# Patient Record
Sex: MALE | Race: BLACK OR AFRICAN AMERICAN | NOT HISPANIC OR LATINO | Employment: UNEMPLOYED | URBAN - METROPOLITAN AREA
[De-identification: names, ages, dates, MRNs, and addresses within clinical notes are randomized per-mention and may not be internally consistent; named-entity substitution may affect disease eponyms.]

---

## 2024-08-08 ENCOUNTER — OFFICE VISIT (OUTPATIENT)
Dept: FAMILY MEDICINE CLINIC | Facility: CLINIC | Age: 44
End: 2024-08-08

## 2024-08-08 VITALS
SYSTOLIC BLOOD PRESSURE: 147 MMHG | HEIGHT: 72 IN | HEART RATE: 86 BPM | WEIGHT: 190.2 LBS | TEMPERATURE: 97.1 F | BODY MASS INDEX: 25.76 KG/M2 | DIASTOLIC BLOOD PRESSURE: 89 MMHG | OXYGEN SATURATION: 97 % | RESPIRATION RATE: 16 BRPM

## 2024-08-08 DIAGNOSIS — Z23 NEED FOR COVID-19 VACCINE: ICD-10-CM

## 2024-08-08 DIAGNOSIS — Z23 ENCOUNTER FOR IMMUNIZATION: ICD-10-CM

## 2024-08-08 DIAGNOSIS — K21.9 GASTROESOPHAGEAL REFLUX DISEASE, UNSPECIFIED WHETHER ESOPHAGITIS PRESENT: ICD-10-CM

## 2024-08-08 DIAGNOSIS — Z76.89 ENCOUNTER TO ESTABLISH CARE WITH NEW DOCTOR: Primary | ICD-10-CM

## 2024-08-08 DIAGNOSIS — Z00.00 ANNUAL PHYSICAL EXAM: ICD-10-CM

## 2024-08-08 DIAGNOSIS — Z60.3 IMMIGRANT WITH LANGUAGE DIFFICULTY: ICD-10-CM

## 2024-08-08 RX ORDER — PANTOPRAZOLE SODIUM 40 MG/1
40 TABLET, DELAYED RELEASE ORAL DAILY
Qty: 30 TABLET | Refills: 1 | Status: SHIPPED | OUTPATIENT
Start: 2024-08-08

## 2024-08-08 SDOH — SOCIAL STABILITY - SOCIAL INSECURITY: ACCULTURATION DIFFICULTY: Z60.3

## 2024-08-08 NOTE — PROGRESS NOTES
Ambulatory Visit  Name: Niles Meyer      : 1980      MRN: [id number]  Encounter Provider: Rebecca Fay Kab-Perlman, MD  Encounter Date: 2024   Encounter department: [facility]    Assessment & Plan   1. Encounter to establish care with new doctor  2. Gastroesophageal reflux disease, unspecified whether esophagitis present  Assessment & Plan:  -worse with typical foods    PLAN  -trail PPI   -f/u in about 3 weeks  Orders:  -     pantoprazole (PROTONIX) 40 mg tablet; Take 1 tablet (40 mg total) by mouth daily  3. Annual physical exam  -     Quantiferon TB Gold Plus Assay; Future  4. Immigrant with language difficulty  Comments:  -had to use friend on phone to interpret form brought in and clarify what tests are required  5. 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 Need for COVID-19 vaccine  Comments:  -f/u 3 weeks for second covid vaccine  -cousneling/education  Orders:  -     COVID-19 Pfizer mRNA vaccine 12 yr and older (GRAY cap vial or pre-filled syringe)       History of Present Illness       Rustam ID# [id number], Roddy ID # [id number], Rachel ID #[id number]     Niles Meyer is a 42 yo male patient presenting today to establish care with a new doctor. Additional concerns today include GERD, and the need to complete a form form the [facility] [employer] [place] [employer] that he brought in and is written in Creole. He states he needs some tests but is unable to communicate which ones.     Regarding the GERD, after eating he gets chest pain. Worse with coffee, spicy foods, and acidic foods. He mentions H. Pylori today stating he thinks that's the cause. He is also concerned regarding bleeding gums however we did not have enough time today to discuss that and prioritized figuring out what tests he  "needs per his request of what his priorities are.     Social: He is from [facility] and speaks Creole.     The following portions of the patient's history were reviewed and updated as appropriate: allergies, current medications, past family history, past medical history, past social history, past surgical history, and problem list.    Review of Systems   Constitutional:  Negative for fever.   HENT:          Bleeding gums   Cardiovascular:  Negative for chest pain and palpitations.   Skin:  Negative for rash.   Neurological:  Negative for weakness and headaches.   Hematological:  Negative for adenopathy. Does not bruise/bleed easily.   Psychiatric/Behavioral:  Negative for behavioral problems.      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 No pertinent past medical history.  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 No pertinent family history.  No current outpatient medications on file prior to visit.     No current facility-administered medications on file prior to visit.   No Known Allergies   No current outpatient medications on file prior to visit.     No current facility-administered medications on file prior to visit.      Social History     Tobacco Use    Smoking status: Never    Smokeless tobacco: Never   Vaping Use    Vaping status: Never Used   Substance and Sexual Activity    Alcohol use: Never    Drug use: Never    Sexual activity: Not Currently     Objective     /89 (BP Location: Left arm, Patient Position: Sitting, Cuff Size: Standard)   Pulse 86   Temp (!) 97.1 °F (36.2 °C) (Temporal)   Resp 16   Ht 6' 0.05\" (1.83 m)   Wt 86.3 kg (190 lb 3.2 oz)   SpO2 97%   BMI 25.76 kg/m² "     Physical Exam  Constitutional:       Appearance: Normal appearance. He is normal weight.   HENT:      Head: Normocephalic and atraumatic.      Right Ear: External ear normal.      Left Ear: External ear normal.      Nose: Nose normal.      Mouth/Throat:      Mouth: Mucous membranes are moist.      Pharynx: Oropharynx is clear.   Eyes:      Extraocular Movements: Extraocular movements intact.      Conjunctiva/sclera: Conjunctivae normal.      Pupils: Pupils are equal, round, and reactive to light.   Cardiovascular:      Rate and Rhythm: Normal rate and regular rhythm.      Pulses: Normal pulses.      Heart sounds: Normal heart sounds. No murmur heard.     No friction rub. No gallop.   Pulmonary:      Effort: Pulmonary effort is normal.      Breath sounds: Normal breath sounds. No wheezing, rhonchi or rales.   Abdominal:      General: Abdomen is flat. Bowel sounds are normal. There is no distension.      Palpations: Abdomen is soft. There is no mass.      Tenderness: There is no abdominal tenderness.      Hernia: No hernia is present.   Genitourinary:     Comments: Deferred at this time  Musculoskeletal:         General: Normal range of motion.      Cervical back: Normal range of motion and neck supple. No rigidity or tenderness.      Right lower leg: No edema.      Left lower leg: No edema.      Comments: Muscle strength 5+ upper and lower extremities   Lymphadenopathy:      Cervical: No cervical adenopathy.   Skin:     General: Skin is warm.      Capillary Refill: Capillary refill takes less than 2 seconds.      Findings: No rash.   Neurological:      General: No focal deficit present.      Mental Status: He is alert and oriented to person, place, and time.   Psychiatric:         Mood and Affect: Mood normal.         Behavior: Behavior normal.       Administrative Statements

## 2024-08-09 ENCOUNTER — APPOINTMENT (OUTPATIENT)
Dept: LAB | Facility: HOSPITAL | Age: 44
End: 2024-08-09
Payer: COMMERCIAL

## 2024-08-09 ENCOUNTER — APPOINTMENT (OUTPATIENT)
Dept: LAB | Facility: HOSPITAL | Age: 44
End: 2024-08-09

## 2024-08-09 DIAGNOSIS — Z00.00 ANNUAL PHYSICAL EXAM: ICD-10-CM

## 2024-08-09 PROCEDURE — 36415 COLL VENOUS BLD VENIPUNCTURE: CPT

## 2024-08-09 PROCEDURE — 86480 TB TEST CELL IMMUN MEASURE: CPT

## 2024-08-10 LAB
GAMMA INTERFERON BACKGROUND BLD IA-ACNC: 0.72 IU/ML
M TB IFN-G BLD-IMP: POSITIVE
M TB IFN-G CD4+ BCKGRND COR BLD-ACNC: 2.95 IU/ML
M TB IFN-G CD4+ BCKGRND COR BLD-ACNC: 3.68 IU/ML
MITOGEN IGNF BCKGRD COR BLD-ACNC: 9.28 IU/ML

## 2024-08-14 ENCOUNTER — TELEPHONE (OUTPATIENT)
Dept: FAMILY MEDICINE CLINIC | Facility: CLINIC | Age: 44
End: 2024-08-14

## 2024-08-14 PROBLEM — K21.9 GASTROESOPHAGEAL REFLUX DISEASE: Status: ACTIVE | Noted: 2024-08-14

## 2024-08-15 ENCOUNTER — TELEPHONE (OUTPATIENT)
Dept: FAMILY MEDICINE CLINIC | Facility: CLINIC | Age: 44
End: 2024-08-15

## 2024-08-15 NOTE — TELEPHONE ENCOUNTER
Interpretor used: Yaz ID # [id number]  Patient tested positive for TB on IGRA and informed. Mentioned xray and stated doesn't know if he has insurance yet. Plan is tomorrow I will check with financial counselors confirm he is covered and if so he will have an CxR performed. If not we will send him to the [facility]. Meanwhile advised he wear an N95 mask and let him know I will be in tomorrow and if he comes by I can provide him with one he just needs to let the front staff know that is what he is here for.

## 2024-08-16 NOTE — TELEPHONE ENCOUNTER
Interpretor used Yaz ID# [id number]  Informed patient that didn't have time today to speak with financial counselors before they left for the day. I sent a high priority message to all of our financial counselors regarding his case and to confirm regarding his insurance. He has been informed. Will reach out again tomorrow. Emphasized he get an N95 right away.

## 2024-08-17 ENCOUNTER — HOSPITAL ENCOUNTER (OUTPATIENT)
Dept: RADIOLOGY | Facility: HOSPITAL | Age: 44
Discharge: HOME | End: 2024-08-17
Payer: MEDICARE

## 2024-08-17 DIAGNOSIS — R76.12 POSITIVE QUANTIFERON-TB GOLD TEST: ICD-10-CM

## 2024-08-17 DIAGNOSIS — R76.12 POSITIVE QUANTIFERON-TB GOLD TEST: Primary | ICD-10-CM

## 2024-08-17 PROCEDURE — 71046 X-RAY EXAM CHEST 2 VIEWS: CPT

## 2024-08-17 NOTE — PROGRESS NOTES
Interpretor used Efrain ID# [id number]    Spoke with patient call complete.   Yesterday spoke with financial counselors: patient is in fact insured.   Confirmed with him  that he does in fact have insurance. Agreed that will go today to complete chest xray which I ordered today. Has upcoming appointment with co-resident Dr. Gtz, will speak with her about the case. Then has upcoming appointment with me in September. Once cxr is reviewed will begin appropriate antibiotics.     Also called lab to confirm results with Gold Plus. If nil >0.4 on green and yellow tubes, confirms likely true positive. Grey tube is negative control and purple is positive control. For green and yellow patient nil value is 2.95 and 3.68 which is significant.

## 2024-09-04 ENCOUNTER — OFFICE VISIT (OUTPATIENT)
Dept: FAMILY MEDICINE CLINIC | Facility: CLINIC | Age: 44
End: 2024-09-04

## 2024-09-04 VITALS
DIASTOLIC BLOOD PRESSURE: 82 MMHG | BODY MASS INDEX: 25.73 KG/M2 | SYSTOLIC BLOOD PRESSURE: 130 MMHG | HEART RATE: 63 BPM | OXYGEN SATURATION: 99 % | WEIGHT: 190 LBS | HEIGHT: 72 IN | TEMPERATURE: 98 F | RESPIRATION RATE: 16 BRPM

## 2024-09-04 DIAGNOSIS — Z23 NEED FOR COVID-19 VACCINE: Primary | ICD-10-CM

## 2024-09-04 DIAGNOSIS — Z23 NEED FOR IMMUNIZATION AGAINST POLIOMYELITIS: ICD-10-CM

## 2024-09-04 DIAGNOSIS — Z22.7 TB LUNG, LATENT: ICD-10-CM

## 2024-09-04 PROCEDURE — 90480 ADMN SARSCOV2 VAC 1/ONLY CMP: CPT | Performed by: FAMILY MEDICINE

## 2024-09-04 PROCEDURE — 91320 SARSCV2 VAC 30MCG TRS-SUC IM: CPT | Performed by: FAMILY MEDICINE

## 2024-09-04 PROCEDURE — 99213 OFFICE O/P EST LOW 20 MIN: CPT | Performed by: FAMILY MEDICINE

## 2024-09-04 NOTE — LETTER
September 4, 2024     Patient: Niles Meyer  YOB: 1980  Date of Visit: 9/4/2024      To Whom it May Concern:    Niles Meyer is under my professional care. Niles was seen in my office on 9/4/2024. Niles was also seen in my office on 8/8/2024. He received his first covid vaccine on 8/8/24 along with his first polio vaccine. Today he received his second covid vaccine and he is scheduling a second polio dose for next week. He will have his third and final polio vaccine 6-12 months after his second.     If you have any questions or concerns, please don't hesitate to call.         Sincerely,          Rebecca Fay Kab-Perlman, MD

## 2024-09-04 NOTE — PROGRESS NOTES
Ambulatory Visit  Name: Niles Meyer      : 1980      MRN: [id number]  Encounter Provider: Rebecca Fay Kab-Perlman, MD  Encounter Date: 2024   Encounter department: [facility]    Assessment & Plan   1. Need for COVID-19 vaccine  Comments:  -2nd covid vaccine delivered today  -counseling provided  Orders:  -     COVID-19 Pfizer mRNA vaccine 12 yr and older (Comirnaty pre-filled syringe)  2. Need for immunization against poliomyelitis  Comments:  Completed form for [employer] [place] [employer]; will need nurse visit in 1 week for second polio dose. 3rd visit will be 6-12 months from second dose.  3. TB lung, latent  Assessment & Plan:  -TB gold assay positive   -CXR negative    PLAN  -today referred to [facility] as can receive medications through them and their specialists per attending recommendation       History of Present Illness       Interpretor Used Marin ID# [id number]  Niles Meyer is a 43 yo male patient presenting today to f/u regarding immunizations as required by the department of [place] security.    Review of Systems   Constitutional:  Negative for chills, diaphoresis, fever and unexpected weight change.   Respiratory:  Negative for cough, chest tightness and shortness of breath.    Cardiovascular:  Negative for chest pain, palpitations and leg swelling.     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  Past Surgical History:   Procedure Laterality Date    OTHER SURGICAL HISTORY Left     testicle removed as a teenager because failed descent     No family history on file.  Current Outpatient Medications on File Prior to Visit   Medication Sig  Dispense Refill    pantoprazole (PROTONIX) 40 mg tablet Take 1 tablet (40 mg total) by mouth daily 30 tablet 1     No current facility-administered medications on file prior to visit.   No Known Allergies   Current Outpatient Medications on File Prior to Visit   Medication Sig Dispense Refill    pantoprazole (PROTONIX) 40 mg tablet Take 1 tablet (40 mg total) by mouth daily 30 tablet 1     No current facility-administered medications on file prior to visit.      Social History     Tobacco Use    Smoking status: Never    Smokeless tobacco: Never   Vaping Use    Vaping status: Never Used   Substance and Sexual Activity    Alcohol use: Never    Drug use: Never    Sexual activity: Not Currently     Objective     /82 (BP Location: Right arm, Patient Position: Sitting, Cuff Size: Standard)   Pulse 63   Temp 98 °F (36.7 °C) (Temporal)   Resp 16   Ht 6' (1.829 m)   Wt 86.2 kg (190 lb)   SpO2 99%   BMI 25.77 kg/m²     Physical Exam  Constitutional:       Appearance: Normal appearance. He is normal weight.   Cardiovascular:      Rate and Rhythm: Normal rate and regular rhythm.      Pulses: Normal pulses.      Heart sounds: Normal heart sounds. No murmur heard.     No friction rub. No gallop.   Pulmonary:      Effort: Pulmonary effort is normal.      Breath sounds: Normal breath sounds. No wheezing, rhonchi or rales.   Skin:     General: Skin is warm.   Neurological:      General: No focal deficit present.      Mental Status: He is alert and oriented to person, place, and time.       Administrative Statements

## 2024-09-08 PROBLEM — Z22.7 TB LUNG, LATENT: Status: ACTIVE | Noted: 2024-09-08

## 2024-09-09 NOTE — ASSESSMENT & PLAN NOTE
-TB gold assay positive   -CXR negative    PLAN  -today referred to [facility] as can receive medications through them and their specialists per attending recommendation

## 2024-11-14 NOTE — PROGRESS NOTES
Name: Niles Meyer      : 1980      MRN: [id number]  Encounter Provider: Rebecca Fay Kab-Perlman, MD  Encounter Date: 11/15/2024   Encounter department: [facility]  :  Assessment & Plan  Abdominal pain, unspecified abdominal location  -of right groin and worse with lifting heavy objects  -suspicious for right inguinal hernia    PLAN  -pelvic ultrasound and f/u one month   -ED precautions provided    Orders:    US pelvis transabdominal only; Future    Acute pain of left shoulder  -works at [employer] and worse with lifting heavy objects   -suspect overuse injury may be rotator cuff    PLAN  -recommended consistent ibuprofen use with food and education/counseling  -recommended rest and explained overuse injury  -regular exercise/stretching with exercises provided in AVS   -on f/u consider steroid injection     Orders:    ibuprofen (MOTRIN) 600 mg tablet; Take 1 tablet (600 mg total) by mouth every 6 (six) hours as needed for mild pain    Positive QuantiFERON-TB Gold test  -no active TB symptoms, negative CXR in last 3 months, not hx of treatment    PLAN  -referral to Infectious Diseases (ID) and message sent to staff to call patient and let him know rather than going to [facility] he can go to ID    Orders:    Ambulatory Referral to Infectious Disease; Future         History of Present Illness     Interpretor Used: Efrain [id number]  Niles Meyer is a 45 yo male patient presenting today to discuss shoulder and abdominal pain. Her is a recent immigrant from [place]. Due to testing positive on TB Gold he was refferred to the [facility] for treatment and today he states he did not go.     Unable to lift anything with his left shoulder. The pain has been present for the past two months. Worse with picking up heavy packages.     Experiencing a lot of pain on his right groin for the past several years. Works at [employer]. Strenuous work makes it worse such  as picking up heavy packages. Has never been sexually active, waiting for marriage.     Review of Systems   Constitutional:  Negative for fever.   Musculoskeletal:  Positive for arthralgias.   Skin:  Negative for rash.   Neurological:  Negative for weakness and numbness.     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  Past Medical History   No past medical history on file.  Past Surgical History:   Procedure Laterality Date    OTHER SURGICAL HISTORY Left     testicle removed as a teenager because failed descent     No family history on file.   reports that he has never smoked. He has never used smokeless tobacco. He reports that he does not drink alcohol and does  not use drugs.  Current Outpatient Medications on File Prior to Visit   Medication Sig Dispense Refill    pantoprazole (PROTONIX) 40 mg tablet Take 1 tablet (40 mg total) by mouth daily 30 tablet 1     No current facility-administered medications on file prior to visit.   No Known Allergies   Current Outpatient Medications on File Prior to Visit   Medication Sig Dispense Refill    pantoprazole (PROTONIX) 40 mg tablet Take 1 tablet (40 mg total) by mouth daily 30 tablet 1     No current facility-administered medications on file prior to visit.      Social History     Tobacco Use    Smoking status: Never    Smokeless tobacco: Never   Vaping Use    Vaping status: Never Used   Substance and Sexual Activity    Alcohol use: Never    Drug use: Never    Sexual activity: Not Currently        Objective   /80 (BP Location: Left arm, Patient Position: Sitting, Cuff Size: Standard)   Pulse 96   Temp 97.6 °F (36.4 °C) (Temporal)   Resp 16   Ht 6' (1.829 m)   Wt 83.9 kg (185 lb)   SpO2 97%   BMI 25.09 kg/m²      Physical Exam  Constitutional:       Appearance: Normal appearance.   HENT:      Head: Normocephalic and atraumatic.      Mouth/Throat:      Mouth: Mucous membranes are moist.   Eyes:      Conjunctiva/sclera: Conjunctivae normal.   Cardiovascular:      Rate and Rhythm: Normal rate and regular rhythm.      Heart sounds: Normal heart sounds. No murmur heard.     No friction rub. No gallop.   Pulmonary:      Effort: Pulmonary effort is normal.      Breath sounds: Normal breath sounds. Stridor present. No wheezing, rhonchi or rales.   Abdominal:      Tenderness: There is abdominal tenderness (right inguinal tenderness with mild protrusion that is reducible).   Musculoskeletal:         General: No swelling or tenderness.      Comments: -Muscle strenght 5+ in upper and lower extremities   -Left shoulder with normal range of motion; there is an audible click with elevation of right shoulder  -Bilaterally negative  empty can test, belly press test, reyes segundo test, and neer test  -Full range of motion of both shoulders bilaterally  -no tenderness to palpation of subdeltoid bursa bilaterally   -Shoulders are not aligned in horizontal plane with left shoulder elevated    Skin:     General: Skin is warm.   Neurological:      Mental Status: He is alert and oriented to person, place, and time.      Sensory: No sensory deficit.      Motor: No weakness.   Psychiatric:         Mood and Affect: Mood normal.         Behavior: Behavior normal.

## 2024-11-15 ENCOUNTER — OFFICE VISIT (OUTPATIENT)
Dept: FAMILY MEDICINE CLINIC | Facility: CLINIC | Age: 44
End: 2024-11-15

## 2024-11-15 VITALS
DIASTOLIC BLOOD PRESSURE: 80 MMHG | HEIGHT: 72 IN | SYSTOLIC BLOOD PRESSURE: 124 MMHG | OXYGEN SATURATION: 97 % | WEIGHT: 185 LBS | RESPIRATION RATE: 16 BRPM | HEART RATE: 96 BPM | TEMPERATURE: 97.6 F | BODY MASS INDEX: 25.06 KG/M2

## 2024-11-15 DIAGNOSIS — R10.9 ABDOMINAL PAIN, UNSPECIFIED ABDOMINAL LOCATION: Primary | ICD-10-CM

## 2024-11-15 DIAGNOSIS — M25.512 ACUTE PAIN OF LEFT SHOULDER: ICD-10-CM

## 2024-11-15 DIAGNOSIS — R76.12 POSITIVE QUANTIFERON-TB GOLD TEST: ICD-10-CM

## 2024-11-15 PROCEDURE — 99213 OFFICE O/P EST LOW 20 MIN: CPT

## 2024-11-15 RX ORDER — IBUPROFEN 600 MG/1
600 TABLET, FILM COATED ORAL EVERY 6 HOURS PRN
Qty: 60 TABLET | Refills: 1 | Status: SHIPPED | OUTPATIENT
Start: 2024-11-15

## 2024-11-15 NOTE — PATIENT INSTRUCTIONS
"Patient Education     Doulè nan zepòl   Eleman debaz   Se doktè yo ak editè yo nan [employer] ki ekri l   Ki silvina ki gen nan zepòl la? -- Jwenti zepòl la gen zo anwo bra, klavikil, e omoplat. Li gen genevieve misk, ligaman, neelam, e bous (figi 1). Tout silvina sa yo travay ansanm liv ra zepòl la bouje byen alèz nan diferan direksyon.  Kaylee ki ka lakòz doulè nan zepòl? -- Doulè nan zepòl ka rive lè w andomaje oswa blese diferan silvina nan zepòl la.  Kondisyon diferan ka lakòz doulè nan zepòl. Yo gen genevieve:   Bousit - sa se yon pwoblèm ki ka lakòz doulè oswa anfleman akote yon jwenti. Yon \"bous\" se yon ti pòch ki plen ak likid ki sitiye toupre yon zo. Li amòti e pwoteje tisi ki toupre yo lè yo fwote oswa glise octavio zo yo. Bisit se lè yon bous rowena irite e anfle.   Zepòl jele - sa se yon pwoblèm ki lakòz zepòl la rèd, fè mal, e difisil liv bouje. Si w gen yon zepòl jele, tisi ki toutotou jwenti zepòl la rowena epè e sere. Sa ka rive apre yon zepòl rowena blese oswa fè operasyon.   Blesi nan kwaf wotatè - kwaf wotatè a fèt avèk 4 misk zepòl e neelam misk pa yo. Neelam se bann tisi solid ki konekte misk yo ak zo yo. Blesi kwaf wotatè lakòz doulè nan zepòl ou e pafwa anwo nan bra w.   Kwensman zepòl - sa rive lè yon misk, neelam, oswa bous rowena kwense ant zo yo.   Zepòl separe - sa rive lè sèten ligaman chire oswa rowena detire twòp. Ligaman se bann tisi solid ki konekte zo ak zo. Kòz ki pi kouran nan yon zepòl separe se tonbe octavio zepòl la oswa resevwa blanca nan zepòl la. Yon zepòl separe ka lejè oswa grav, selon kantite ligaman ki chire.   Osteyoatrit - se yon pwoblèm kouran ki souvan akonpaye laj. Katilamaribell nan jwenti yo ize, sa ki lakòz zo yo fwote mile ak lòt. Sa ka lakòz doulè, redisman, e anflemen nan jwenti zepòl yo.  Èske m ap bezwen tès? -- Petèt. Doktè w oswa enfimye/enfimyè w ap pale avèk ou epi egzamine w. Yo ka fè samantha yon tès imajri nan zepòl ou tèlke yon radyografi, MRI, oswa ekografi. Tès imajri fè foto andedawale edwards a.  Saulman doulè " "nan zepòl trete? -- Anpil kòz doulè nan zepòl amelyore liv kont yo. Men, sa ka pran plizyè semèn jiska plizyè mwa liv yo leora nèt.  Doktè w ka rekòmande:   Medikaman liv soulaje doulè ki rele \"medikaman anti-enflamatwa non-stewoyidyen\" (NSAID) - sa yo gen genevieve ibipwofèn (non tana echantiyon: Advil, Motrin) e napwoksenn (egzanp non tana: Aleve). Yo ka diminye doulè e anfleman.   Fè egzèsis e detire - li ka itil liv travay avèk yon fizyoterapèt (ekspè nan egzèsis). Li ka aprann ou detire e fè egzèsis modere liv soulaje sentòm ou yo. Suiv konsèy fizyoterapèt la liv konnen konbyen fwa e kilè liv w fè egzèsis yo.   Piki stewoyid - medikaman stewoyid ra redui anflamasyon. Doktè ka enjekte stewoyid nan zepòl la liv ra redui sentòm yo.   Operasyon - operasyon ka yon opsyon si lòt tretman pa giselle epi ou te gen sentòm pandan anpil tan.  Èske gen yon bagay mwen ka fè liv kont mwen liv m chani m pi byen?    Repoze zepòl ou - evite leve bagay, evite lonje bra w anlè tèt ou oswa atravè pwatrin ou, oswa dòmi octavio zepòl ki fè mal la. Si doktè w te ba ou yon echap liv soutni bra w, suiv machasuiv liv itilize l. Oswa ou ta ka mete melissa yon bandaj ki elizabeth toutotou zepòl ou e silvina anwo nan do w.   Pran medikaman liv diminye doulè e anfleman - liv trete doulè, ou ka pran asetaminofèn (egzanp non tana: Tylenol). Liv trete doulè e anflamasyon, ou ka pran ibipwofèn (egzanp non tana: Advil, Motrin).   Kore zepòl ou octavio zòrye - maida rossi pi wo pase nivo kè w. Sa ka soulaje doulè e anfleman.   Mete glas octavio zepòl ou - mete yon pakè jèl frèt, yon sachè glas, oswa yon sachè legim jele octavio zòn ki blese a pandan 15 minit chak 1 a 2 èdtan. Mete yon sèvyèt fen ant glas la (oswa lòt objè frèt) ak po w. Sèvi avèk glas la (oswa lòt objè frèt) pandan omwen 6 èdtan apre blesi ou a. Kèk moun ruby li itil liv mete glas pi christelle, charmaine reese 2 coral apre blesi a. Glas kapab itil samantha apre w fin fè egzèsis zepòl.   Mete chalè octavio zòn shayy liv diminye doulè e " redisman - pa sèvi avèk chalè pandan plis pase 20 minit alafwa. Epitou, pa sèvi avèk anyen ki twò cho ki ka boule po w.   Fè egzèsis pandil liv anpeche zepòl ou rowena twò rèd (figi 2):   [place] bra w detann epi pann pandan w gabe oswa kanpe. Bouje bra w dousman:   Armida iva octavio kote w   De yon bò a yon lòt bò atravè kò w   Toutotou an ti sèk   Fè egzèsis sa a pandan 5 minit, 1 oswa 2 fwa pa coral.   Kòmanse dousman, epi fè egzèsis yo pi difisil ofiramezi. Pa egzanp, fè ti sèk avèk bra w okòmansman. Apre yon chambers, fè pi gwo sèk oswa kenbe pwa nan men w.   Doktè w, enfimye/enfimyè, oswa fizyoterapèt ou ka montre w kijan liv w fè lòt egzèsis liv misk toutotou zepòl ou pi pisan. Yo pral di w kilè liv w kòmanse yo epi chak kilè liv w fè yo.   Anvan ou fè egzèsis zepòl ou, chofe misk yo anvan. Ou ka fè sa lè nan pran yon douch oswa alden cho, oswa nan itilize yon kousen chofan. Li nòmal liv gen enpe doulè. Si w gen doulè ki fò, dechiran oswa ki rowena pi grav, sispann sa w ap fè a epi enfòme doktè w oswa enfimye/enfimyè w.  Kilè m ta dwe rele doktè a? -- Rele tousuit liv enn èd julijans (mazin [employer] 9-1-1) si:   Ou gen doulè nan zepòl epi ou kòmanse gen pwoblèm liv respire oswa move [facility] Cent[facility] cox pwatrin.  Rele doktè a oswa enfimye/enfimyè a liv bere si:   Ou gen yon doulè trè move [facility] pa yancy.   Men w oswa bra w rowena fèb oswa anfle.   Dwèt ou angoudi, gen pikotman, oswa koulè ble oswa gri.  Mizajou fèt nan tout sijè yo ofiramezi nouvo prèv rowena disponib epi pwosesis egzamen pa kòlèg nou an fin fèt.  Sijè sa a ekstrè nan [facility] Apr 25, 2024.  Sijè [id number] [facility] 1.0.ht.1  Release: 32.4.2 - C32.114  © 2024 [employer]. ak/jorje kovacs yo All rights reserved.  figi 1: Gretchen nan zepòl la     Sa yo se diferan gretchen nan zepòl la christine ou wè l octavio addison (A) e octavio danuta (B). Jwenti zepòl la gen zo anwo bra, klavikil, e omoplat. madelin [place], e neelam yo ra maida zarateenti a an plas epi pèmèt ou bouje  "bra w. Yon \"bous\" se yon ti pòch ki plen likid ki sitiye toupre yon zo. Li amòti e pwoteje tisi ki toupre yo lè yo fwote oswa glise octavio zo yo.  Graphic [id number] Version 1.0  figi 2: Egzèsis pandil     Detann bra w epi kite yo pann lè w gabe oswa kanpe. Deplase bra w dousman elizabeth iva octavio kote kò w, apresa de yon bò a yon lòt bò addison kò w, epi apresa nan ti wonn. Fè egzèsis sa a pandan 5 minit, 1 oswa 2 fwa pa coral. Ou ka fè egzèsis sa a pi difisil si w fè pi gwo mouvman oswa w kenbe yon ti pwa nan men w.  Graphic [id number] Version 1.0  Itilizasyon ak dechaj responsablite konsènan enfòmasyon konsomatè yo   Itilizasyon ak dechaj responsablite konsènan enfòmasyon konsomatè yo: Dokiman jeneral sa a se yon ti rezime octavio [facility], [facility] ak[facility]oswa [facility]. Yelena mobley bay tout enfòmasyon ki nesesè yo. Kidonk, ou pa ta dwe sèvi avè l kòm yon zouti liv ra itilizatè a konprann ak/oswa evalye diferan diagnostik ak tretman ki posib yo. Yelena PA bay tout enfòmasyon ki nesesè yo octavio pwoblèm sante, [facility] Hospital[facility], [facility], efè segondè oswa risk ki gendwa aplike liv yon pasyan an patikilye. Yelena mobley sipoze bay bere [facility] ni yelena mobley la liv ranplase bere [facility], [facility] os[facility] Hospitalan yon pwofesyonèl sante bay apre li egzamine pasyan an e apre li evalye sitiyasyon espesifik li a. Pasyan yo dwe pale ak yon pwofesyonèl sante liv jwenn tout enfòmasyon yo bezwen yo octavio sante yo, liv jwenn repons liv kesyon [facility] yo, liv aprann [facility] ki disponib yo e liv konnen ki risk ak avantamaribell ki mache ak [facility] yo. Enfòmasyon sa yo pa fè pwomosyon liv okSentara Martha Jefferson Hospitalan osMc[facility] kòm trean [facility] ki bon, ki efikas oswa ki apwouve liv trete yon pasyan an patikilye. [employer] ak jonathan uf afcathiee avè l yo pa bay [facility] garanti ni yo pa aksepte [facility] responsablite konsènan enfòmasyon sa yo ak fason moun yo itilize yo.Tout moun ki itilize enfòmasyon sa yo dwe respekte kondisyon itilizasyon yo, ki disponib octavio " https://www.wolterskluwer.com/en/know/clinical-effectiveness-terms. Copyright © 2024 UpToDate[employer] Inc. troy russell/jorje stephenson. Merrill macedo.  Dwadotè   © 2024 [employer]. ak/jorje stephenson All rights reserved.    Patient Education     Stretching Exercises for Your Upper Body   About this topic   Stretching is a kind of exercise. When you stretch, you make a specific muscle or group of muscles longer. Stretching is good for you. It increases blood flow to a muscle. This can help get your muscles ready for other exercises. Stretching can also help you relax and may keep you from hurting your muscles.  General   Before starting with a program, ask your doctor if you are healthy enough to do these exercises. Your doctor may have you work with a , chiropractor, or physical therapist to make a safe exercise program to meet your needs.  Stretching Exercises   Stretching exercises keep your muscles flexible. They also stop them from getting tight. Start by doing each of these stretches 2 to 3 times. In order for your body to make changes, you will need to hold these stretches for 20 to 30 seconds. Try to do the stretches 2 to 3 times each day. Do all exercises slowly.  If you have balance problems, do not try standing stretches. There are other safe ways to stretch many muscles while sitting or lying down.  Neck stretches ? Put your left hand on top of your head. Your other arm can be at your side or behind your back. Pull your head toward your left shoulder until you feel a gentle stretch on the right side of your neck. Repeat on the other side. Also, try this stretch by pulling in a diagonal direction. With your left hand on top of your head, pull your head down towards the direction of your left knee. You should feel this stretch towards the back on the right side of your neck. Repeat on the other side.  Corner stretches:  T position ? Stand about one foot away  "from a corner. Bend your elbows and bring your upper arms to shoulder height. Rest your arms on the wall. Keep your back straight and gently lean forward until you feel a stretch in the front of your chest and shoulders.  V position ? Stand about one foot away from a corner. With your elbows straight, put only your hands on the wall and make a letter \"V\". Keep your back straight and gently lean forward until you feel a stretch in the front of your chest and shoulders.  Front shoulder stretches ? Stand up straight with your legs apart. Grasp your hands behind your back. Stretch your arms and lift your hands upward.  Back shoulder stretches ? Stand up straight with legs wide apart or sit up tall on a chair. Cross your arm across your body at shoulder level. Using the other arm, pull the crossed arm towards the other shoulder. Repeat using the other arm.           What will the results be?   More flexible  Better posture  Prevent injury  Lower stress  Easier to do daily activities  Helpful tips   Stay active and work out to keep your muscles strong and flexible.  Keep a healthy weight so there is not extra stress on your joints. Eat a healthy diet to keep your muscles healthy.  Be sure you do not hold your breath when exercising. This can raise your blood pressure. If you tend to hold your breath, try counting out loud when exercising. If any exercise bothers you, stop right away.  Always warm up before stretching. Heated muscles stretch much easier than cool muscles. Stretching cool muscles can lead to injury.  Try walking and swinging your arms at an easy pace for a few minutes to warm up your muscles. Do this again after exercising.  Never bounce when doing stretches.  Doing exercises before a meal may be a good way to get into a routine.  Exercise may be slightly uncomfortable, but you should not have sharp pains. If you do get sharp pains, stop what you are doing. If the sharp pains continue, call your " "doctor.  Last Reviewed Date   2021-06-28  Consumer Information Use and Disclaimer   This generalized information is a limited summary of diagnosis, treatment, and/or medication information. It is not meant to be comprehensive and should be used as a tool to help the user understand and/or assess potential diagnostic and treatment options. It does NOT include all information about conditions, treatments, medications, side effects, or risks that may apply to a specific patient. It is not intended to be medical advice or a substitute for the medical advice, diagnosis, or treatment of a health care provider based on the health care provider's examination and assessment of a patient’s specific and unique circumstances. Patients must speak with a health care provider for complete information about their health, medical questions, and treatment options, including any risks or benefits regarding use of medications. This information does not endorse any treatments or medications as safe, effective, or approved for treating a specific patient. UpToDate, Inc. and its affiliates disclaim any warranty or liability relating to this information or the use thereof. The use of this information is governed by the Terms of Use, available at https://www.TRUECar.com/en/know/clinical-effectiveness-terms   Copyright   Copyright © 2024 UpToDate, Inc. and its affiliates and/or licensors. All rights reserved.    Patient Education     Back exercises   The Basics   Written by the doctors and editors at [employer]   Why do I need to do back exercises? -- Back exercises can help ease back pain and might help prevent future back pain. Long term, it is important to strengthen the muscles in your lower back, buttocks, and belly. These are your \"core muscles.\" Stretching exercises are also important to keep your muscles flexible.  Below are some stretching and strengthening exercises that might help you. Other forms of movement can help ease or " "prevent back pain, too. For example, some people like to walk, do aerobic exercise, or do yoga or aniket chi. The most important thing is to move your body. Your doctor, nurse, or physical therapist can help you find different types of activity that work for you.  What stretching exercises should I do? -- Below are some examples of stretching exercises. Warm up your muscles before stretching to help prevent injury. To warm up, you can walk, jog, or cycle for a few minutes.  Start by repeating each of these stretches 2 to 3 times. Try to hold each stretch for 5 to 10 seconds, and try to do the stretches 2 to 3 times each day. Breathe slowly and deeply as you do the exercises. Never bounce when doing stretches.   Cat-cow stretch (figure 1) - Start on all fours on the floor, with your hands under your shoulders, knees under your hips, and your back flat. First, tuck your chin and tighten your stomach muscles as you round your back (like a \"cat\"). Hold the stretch for about 10 seconds. Rest for a few seconds as you flatten your back. Next, lift your chin and let your belly and lower back sink toward the floor (like a \"cow\"). Hold the stretch for about 10 seconds.   Single knee-to-chest stretches (figure 2) ? While lying on your back, bend your knees with your feet flat on the floor. Pull 1 knee toward your chest until you feel a stretch in your lower back and buttock area. Lower, and repeat with the other knee. If you have knee problems, pull your knee up by grabbing the back of your thigh instead of the front of your knee. You can also do this exercise by grabbing both knees at the same time.   Lower trunk rotations (figure 3) ? While lying on your back, bend your knees with your feet flat on the floor. Keep your knees and ankles together, and then drop them to 1 side. Keep both of your shoulders touching the floor until you feel a stretch in the muscles at the side of the back. Repeat on the other side.   Lower back " stretches seated (figure 4) ? Sit in a chair with your feet spread about shoulder width apart. Then, lean forward until you feel a stretch in your lower back.  What strengthening exercises should I do? -- Below are some examples of strengthening exercises.  Start by doing each exercise 2 to 3 times. Work up to doing each exercise 10 times. Hold each exercise for 3 to 5 seconds, and try to do the exercises 2 to 3 times each day. Do all exercises slowly.   Shoulder blade squeezes (figure 5) ? Pinch your shoulder blades together on your upper back, and hold 3 to 5 seconds. You can also do these 1 side at a time. Sit with good posture, and make sure that your shoulders do not rise up when you do this exercise. Relax.   Pelvic tilts (figure 6) ? Lie on your back with your knees bent and feet flat on the floor. Tighten your stomach muscles, and press your lower back down to the floor. Relax. You should be able to breathe comfortably during this exercise.   Hip lifts (figure 7) ? Lie on your back with your knees bent and feet flat on the floor. Tighten your stomach muscles, keep your back flat, and lift your buttocks off of the floor. Relax. You should feel this in your buttocks, not in your lower back.  What else should I know?    Exercise, including stretching, might be slightly uncomfortable. But you should not have sharp or severe pain. If you do get severe pain, stop what you are doing. If severe pain continues, call your doctor or nurse.   Do not hold your breath when exercising. If you tend to hold your breath, try counting out loud when exercising. If any exercise bothers you, stop right away.   Always warm up before exercising. Warm muscles stretch much easier than cool muscles. Stretching cool muscles can lead to injury.   Doing exercises before a meal can be a good way to get into a routine.  All topics are updated as new evidence becomes available and our peer review process is complete.  This topic retrieved  "from [employer] on: Apr 03, 2024.  Topic 204384 Version 2.0  Release: 32.2.4 - C32.92  © 2024 UpToDate, Inc. and/or its affiliates. All rights reserved.  figure 1: Cat-cow stretch     Start on all fours on the floor, with hands under your shoulders, knees under your hips, and your back flat. First, tuck your chin and tighten your stomach muscles as you round your back (like a \"cat\"). Hold the stretch for about 10 seconds. Rest for a few seconds as you flatten your back. Next, lift your chin and let your belly and lower back sink toward the floor (like a \"cow\"). Hold the stretch for about 10 seconds.  Graphic [id number] Version 1.0  figure 2: Single knee-to-chest stretch     Lie on your back, bend your knees, and have your feet flat on the floor. Pull 1 knee toward your chest until you feel a stretch in your lower back and buttock area. Repeat with the other knee. If you have knee problems, pull your knee up by grabbing the back of your thigh instead of the front of your knee. You can also do this exercise by grabbing both knees at the same time.  Graphic [id number] Version 1.0  figure 3: Lower trunk rotation     While lying on your back, bend your knees and have your feet flat on the floor. Keep your legs together and then drop them to 1 side. Keep both of your shoulders touching the floor until you feel a stretch in the muscles at the side of the back. Repeat on the other side.  Graphic [id number] Version 1.0  figure 4: Lower back stretch     Sit in a chair with your feet spread about shoulder width apart. Then, lean forward until you feel a stretch in your lower back.  Graphic [id number] Version 1.0  figure 5: Shoulder blade squeezes     Pinch your shoulder blades together on your upper back and hold for 3 to 5 seconds. Make sure that you are sitting with good posture and that your shoulders do not raise up when you do this exercise. Relax.  Graphic [id number] Version 1.0  figure 6: Pelvic tilts     Lie on your back with your knees " bent and feet flat on the floor. Tighten your stomach muscles and press your lower back down to the floor. Relax.  Graphic [id number] Version 1.0  figure 7: Hip lifts     Lie on your back with your knees bent and feet flat on the floor. Tighten your stomach muscles and lift your buttocks off of the floor. Relax.  Graphic [id number] Version 1.0  Consumer Information Use and Disclaimer   Disclaimer: This generalized information is a limited summary of diagnosis, treatment, and/or medication information. It is not meant to be comprehensive and should be used as a tool to help the user understand and/or assess potential diagnostic and treatment options. It does NOT include all information about conditions, treatments, medications, side effects, or risks that may apply to a specific patient. It is not intended to be medical advice or a substitute for the medical advice, diagnosis, or treatment of a health care provider based on the health care provider's examination and assessment of a patient's specific and unique circumstances. Patients must speak with a health care provider for complete information about their health, medical questions, and treatment options, including any risks or benefits regarding use of medications. This information does not endorse any treatments or medications as safe, effective, or approved for treating a specific patient. UpToDate, Inc. and its affiliates disclaim any warranty or liability relating to this information or the use thereof.The use of this information is governed by the Terms of Use, available at https://www.10sec.com/en/know/clinical-effectiveness-terms. 2024© UpToDate, Inc. and its affiliates and/or licensors. All rights reserved.  Copyright   © 2024 UpToDate, Inc. and/or its affiliates. All rights reserved.

## 2024-11-18 PROBLEM — R10.9 ABDOMINAL PAIN: Status: ACTIVE | Noted: 2024-11-18

## 2024-11-18 PROBLEM — M25.512 ACUTE PAIN OF LEFT SHOULDER: Status: ACTIVE | Noted: 2024-11-18

## 2024-11-18 NOTE — ASSESSMENT & PLAN NOTE
-of right groin and worse with lifting heavy objects  -suspicious for right inguinal hernia    PLAN  -pelvic ultrasound and f/u one month   -ED precautions provided    Orders:    US pelvis transabdominal only; Future

## 2024-11-18 NOTE — ASSESSMENT & PLAN NOTE
-works at [employer] and worse with lifting heavy objects   -suspect overuse injury may be rotator cuff    PLAN  -recommended consistent ibuprofen use with food and education/counseling  -recommended rest and explained overuse injury  -regular exercise/stretching with exercises provided in AVS   -on f/u consider steroid injection     Orders:    ibuprofen (MOTRIN) 600 mg tablet; Take 1 tablet (600 mg total) by mouth every 6 (six) hours as needed for mild pain

## 2024-11-19 ENCOUNTER — TELEPHONE (OUTPATIENT)
Dept: FAMILY MEDICINE CLINIC | Facility: CLINIC | Age: 44
End: 2024-11-19

## 2024-11-19 ENCOUNTER — TELEPHONE (OUTPATIENT)
Age: 44
End: 2024-11-19

## 2024-11-19 NOTE — TELEPHONE ENCOUNTER
----- Message from Rebecca Kab-Perlman, MD sent at 11/18/2024  2:56 PM EST -----  Please call N[place], let him know that for Tuberculosis treatment rather than go to [facility], I have placed a referral to infectious disease and he can make an appointment with any [facility] Infectious disease provider.     Thanks,   Dr. Kab-Perlman

## 2024-11-21 NOTE — TELEPHONE ENCOUNTER
Spoke with pt using  Trudy Felix.       Confirm that the patient has no symptoms to suggest active TB (prolonged fever, chronic cough, hemoptysis, unexplained weight loss).     If symptoms, recommend referral to ED after discussing with PCP.    Is the patient pregnant? No  If YES, close referral and have patient obtain new referral following pregnancy and breastfeeding.    Has there been inconclusive chest imaging in the past or indeterminate QuantiFERON gold testing in the past? No    Has the patient ever received a BCG vaccine? No  If YES, schedule only with a provider.    Have you ever completed treatment for TB or latent TB before? No  If YES to either, no need for additional treatment, so refer back to primary  If NO to both, schedule with AP. AP requires one week for chart review prior to patient appointment. Physician can be scheduled for the next available appointment       Spoke with pt using  asked and answered above questions. Explained to pt why he was being referred to our office and what to expect. Informed him as well that he can go to the [facility] as well.    Pt has decided to schedule an appt with our office in [place].

## 2024-11-22 ENCOUNTER — HOSPITAL ENCOUNTER (OUTPATIENT)
Dept: ULTRASOUND IMAGING | Facility: HOSPITAL | Age: 44
End: 2024-11-22
Payer: MEDICARE

## 2024-11-22 DIAGNOSIS — R10.9 ABDOMINAL PAIN, UNSPECIFIED ABDOMINAL LOCATION: ICD-10-CM

## 2024-11-22 PROCEDURE — 76705 ECHO EXAM OF ABDOMEN: CPT

## 2024-12-02 ENCOUNTER — TELEPHONE (OUTPATIENT)
Dept: FAMILY MEDICINE CLINIC | Facility: CLINIC | Age: 44
End: 2024-12-02

## 2024-12-12 ENCOUNTER — CONSULT (OUTPATIENT)
Dept: INFECTIOUS DISEASES | Facility: CLINIC | Age: 44
End: 2024-12-12
Payer: MEDICARE

## 2024-12-12 VITALS
WEIGHT: 180 LBS | TEMPERATURE: 97.5 F | DIASTOLIC BLOOD PRESSURE: 75 MMHG | SYSTOLIC BLOOD PRESSURE: 120 MMHG | HEART RATE: 84 BPM | BODY MASS INDEX: 24.41 KG/M2 | OXYGEN SATURATION: 97 %

## 2024-12-12 DIAGNOSIS — R76.12 POSITIVE QUANTIFERON-TB GOLD TEST: ICD-10-CM

## 2024-12-12 DIAGNOSIS — Z22.7 TB LUNG, LATENT: Primary | ICD-10-CM

## 2024-12-12 PROCEDURE — 99244 OFF/OP CNSLTJ NEW/EST MOD 40: CPT | Performed by: INTERNAL MEDICINE

## 2024-12-12 RX ORDER — RIFAMPIN 300 MG/1
600 CAPSULE ORAL DAILY
Qty: 60 CAPSULE | Refills: 1 | Status: SHIPPED | OUTPATIENT
Start: 2024-12-12 | End: 2025-02-10

## 2024-12-12 NOTE — PATIENT INSTRUCTIONS
Kòmanse Rifampin 600mg (2 tablèt) yon fwa kenny stephensonn wallace. [facility] nickie barber, drew Song sa a, farhad grande ou evelyne napier sa yo.    Tyreev casimiroa 6 semèn.  Rele nou si ou gen del estrada.

## 2024-12-13 ENCOUNTER — RESULTS FOLLOW-UP (OUTPATIENT)
Dept: FAMILY MEDICINE CLINIC | Facility: CLINIC | Age: 44
End: 2024-12-13

## 2024-12-13 DIAGNOSIS — R19.09 MASS OF INGUINAL REGION: Primary | ICD-10-CM

## 2024-12-13 NOTE — ASSESSMENT & PLAN NOTE
Patient has latent TB, evidenced by positive QuantiFERON-TB Gold test.  Although patient has no recollection of TB exposure, he likely was exposed to TB during his growing up years in [place].  At present, he has no respiratory or constitutional symptoms.  Recent CXR had no infiltrate.  Patient is a candidate for treatment for his latent TB.  I discussed with him via  multiple treatment options.  He is agreeable to rifampin for 4 months.  I discussed with patient in great detail regarding diagnosis of 2 capsules with rifampin once a day for 4 months.  He needs to get baseline LFTs and repeat LFTs every month while on rifampin.  He also needs to avoid alcohol, Tylenol and any hepatotoxic medications.  Patient is able to repeat all instructions back to me via the .  Check baseline LFTs.  Start rifampin 600 mg daily x 4 months.  Monitor LFTs monthly.  Avoid alcohol, Tylenol and any hepatotoxic medications.

## 2024-12-13 NOTE — PROGRESS NOTES
Name: Niles Meyer      : 1980      MRN: [id number]  Encounter Provider: Miko Madera MD  Encounter Date: 2024   Encounter department: [facility] [place]  :  Assessment & Plan  TB lung, latent  Patient has latent TB, evidenced by positive QuantiFERON-TB Gold test.  Although patient has no recollection of TB exposure, he likely was exposed to TB during his growing up years in [facility] [place].  At present, he has no respiratory or constitutional symptoms.  Recent CXR had no infiltrate.  Patient is a candidate for treatment for his latent TB.  I discussed with him via  multiple treatment options.  He is agreeable to rifampin for 4 months.  I discussed with patient in great detail regarding diagnosis of 2 capsules with rifampin once a day for 4 months.  He needs to get baseline LFTs and repeat LFTs every month while on rifampin.  He also needs to avoid alcohol, Tylenol and any hepatotoxic medications.  Patient is able to repeat all instructions back to me via the .  Check baseline LFTs.  Start rifampin 600 mg daily x 4 months.  Monitor LFTs monthly.  Avoid alcohol, Tylenol and any hepatotoxic medications.  Positive QuantiFERON-TB Gold test  This is most likely secondary to prior TB infection, now with latent TB.  Plan for treatment as in above.      History of Present Illness   Reason for Consult: Positive QuantiFERON-TB gold  HPI: Niles Meyer is a 44 y.o. year old male, recently immigrated from [place] [facility] [place], otherwise relatively healthy, had positive QuantiFERON-TB gold on as part of his immigration blood work.  This came back positive.  Therefore, patient was referred to us for evaluation.    At present, patient feels well.  He has no fever, chills, night sweat, anorexia or weight loss.  He also has no chest pain, dyspnea or cough.  He has no known TB exposure.  He has never had TB testing previously.    ROS: A complete review of systems are negative  "other than that noted in the HPI        Antibiotics: None    Objective   /75   Pulse 84   Temp 97.5 °F (36.4 °C)   Wt 81.6 kg (180 lb)   SpO2 97%   BMI 24.41 kg/m²      General: Alert, interactive, appearing well, nontoxic, no acute distress.  Throat: Oropharynx moist without lesions.   Lungs: Clear to auscultation bilaterally; no audible wheezes, rhonchi or rales; respirations unlabored  Heart: RRR; no murmur, rub or gallop  Abdomen: Soft, non-tender, non-distended, positive bowel sounds.    Extremities: No clubbing, cyanosis or edema  Skin: No new rashes or lesions. No new draining wounds.    Lab Results: I have personally reviewed pertinent labs.  No results found for: \"NA\", \"K\", \"CL\", \"CO2\", \"ANIONGAP\", \"BUN\", \"CREATININE\", \"GLUCOSE\", \"GLUF\", \"CALCIUM\", \"CORRECTEDCA\", \"AST\", \"ALT\", \"ALKPHOS\", \"PROT\", \"BILITOT\", \"EGFR\"  No results found for: \"WBC\", \"HGB\", \"HCT\", \"MCV\", \"PLT\"No results found for: \"ESR\"      Radiology Results Review: I personally reviewed the following image studies in PACS and associated radiology reports: chest xray. My interpretation of the radiology images/reports is: CXR is without infiltrate.      "

## 2024-12-15 NOTE — ASSESSMENT & PLAN NOTE
-Began in November 2024  -overuse and exacerbated by his work at [employer] lifting heavy objects particularly during the busy holiday season  -improved with rest and home stretching; worsened by lifting heavy objects  -did not use the ibuprofen as forgot to pick it up    PLAN  -recommended continued stretching and can use ibuprofen if very painful  -recommended avoiding lifting heavy objects however if has to for work recommended deriving strength from legs and lower back and not through internal rotation of shoulders; demonstrated technique in office

## 2024-12-15 NOTE — PROGRESS NOTES
"Name: Niles Meyer      : 1980      MRN: [id number]  Encounter Provider: Rebecca Fay Kab-Perlman, MD  Encounter Date: 2024   Encounter department: [facility]  :  Assessment & Plan  Acute pain of left shoulder  -Began in 2024  -overuse and exacerbated by his work at [employer] lifting heavy objects particularly during the busy holiday season  -improved with rest and home stretching; worsened by lifting heavy objects  -did not use the ibuprofen as forgot to pick it up    PLAN  -recommended continued stretching and can use ibuprofen if very painful  -recommended avoiding lifting heavy objects however if has to for work recommended deriving strength from legs and lower back and not through internal rotation of shoulders; demonstrated technique in office       TB lung, latent  -Now following with ID, on rifampin       Abdominal pain, unspecified abdominal location  -Right inguinal tenderness  -US groin/inguinal 24 reveals \"mildly echogenic ovoid structure in the subcutaneous fat may represent a lipoma or reactive lymph node with prominent fatty hilum. Recommend follow-up ultrasound in 3 months to confirm stability or resolution.\"    PLAN  -dicussed results with patient, today provided with central scheduling number and instructions to make an appointment for repeat US around 24; order in the system               History of Present Illness     Interpretor Used ID# [id number]  Niles Meyer is a 45 yo male patient presenting today to f/u regarding left shoulder pain. He works at [employer] and lifting heavy objects exacerbates the pain. I had high suspicion on our previous office visit that this is a overuse injury likely related to his rotator cuff. I recommended consistent ibuprofen use, exercise /stretching after a period of rest and we discussed the possibility of a steroid injection.     Review of Systems   Constitutional:  Negative for fever. "   Respiratory:  Negative for shortness of breath.    Cardiovascular:  Negative for chest pain, palpitations and leg swelling.   Gastrointestinal:  Negative for abdominal pain.   Musculoskeletal:  Negative for arthralgias, back pain, myalgias and neck stiffness.   Skin:  Negative for rash.   Neurological:  Negative for weakness.   Hematological:  Does not bruise/bleed easily.       Objective   /80 (BP Location: Left arm, Patient Position: Sitting, Cuff Size: Standard)   Pulse 83   Temp 97.8 °F (36.6 °C) (Temporal)   Resp 14   Ht 6' (1.829 m)   Wt 80.6 kg (177 lb 9.6 oz)   SpO2 96%   BMI 24.09 kg/m²      Physical Exam  Constitutional:       Appearance: Normal appearance. He is normal weight.   HENT:      Head: Normocephalic and atraumatic.   Eyes:      Conjunctiva/sclera: Conjunctivae normal.   Cardiovascular:      Rate and Rhythm: Normal rate and regular rhythm.      Heart sounds: Normal heart sounds. No murmur heard.     No friction rub. No gallop.   Pulmonary:      Breath sounds: Normal breath sounds. No wheezing, rhonchi or rales.   Musculoskeletal:      Comments: -Muscle strength 5+ of bilateral upper and lower extremities  -Bilateral shoulders with full active and passive ROM   -Left shoulder with no pain to palpation of acromioclavicular process, bursa, attachment sites on femoral head of musculature  -Mild muscle spasm of lower thoracic paraspinal muscle however no muscle spasm of trapezius or deltoids  -Empty can test negative  -no audible click with varying motions   Skin:     General: Skin is warm.   Neurological:      General: No focal deficit present.      Mental Status: He is alert and oriented to person, place, and time.   Psychiatric:         Mood and Affect: Mood normal.         Behavior: Behavior normal.

## 2024-12-16 ENCOUNTER — OFFICE VISIT (OUTPATIENT)
Dept: FAMILY MEDICINE CLINIC | Facility: CLINIC | Age: 44
End: 2024-12-16

## 2024-12-16 VITALS
HEART RATE: 83 BPM | OXYGEN SATURATION: 96 % | TEMPERATURE: 97.8 F | DIASTOLIC BLOOD PRESSURE: 80 MMHG | RESPIRATION RATE: 14 BRPM | BODY MASS INDEX: 24.06 KG/M2 | SYSTOLIC BLOOD PRESSURE: 118 MMHG | WEIGHT: 177.6 LBS | HEIGHT: 72 IN

## 2024-12-16 DIAGNOSIS — R10.9 ABDOMINAL PAIN, UNSPECIFIED ABDOMINAL LOCATION: ICD-10-CM

## 2024-12-16 DIAGNOSIS — M25.512 ACUTE PAIN OF LEFT SHOULDER: Primary | ICD-10-CM

## 2024-12-16 DIAGNOSIS — Z22.7 TB LUNG, LATENT: ICD-10-CM

## 2024-12-16 NOTE — ASSESSMENT & PLAN NOTE
"-Right inguinal tenderness  -US groin/inguinal 11/22/24 reveals \"mildly echogenic ovoid structure in the subcutaneous fat may represent a lipoma or reactive lymph node with prominent fatty hilum. Recommend follow-up ultrasound in 3 months to confirm stability or resolution.\"    PLAN  -dicussed results with patient, today provided with central scheduling number and instructions to make an appointment for repeat US around 2/22/24; order in the system        "

## 2025-01-08 ENCOUNTER — TELEPHONE (OUTPATIENT)
Dept: INFECTIOUS DISEASES | Facility: CLINIC | Age: 45
End: 2025-01-08

## 2025-01-08 NOTE — TELEPHONE ENCOUNTER
Contacted patient to inform them of their upcoming appointment, as well as give a friendly reminder to please have their blood work completed prior to visit.    No answer, left message along with office callback number and encouraged them to reach out if they are unable to do so as we may need to re-schedule them.

## 2025-01-09 PROBLEM — R76.12 POSITIVE QUANTIFERON-TB GOLD TEST: Status: ACTIVE | Noted: 2025-01-09

## 2025-01-09 NOTE — PATIENT INSTRUCTIONS
Continue oral Rifampin, 600mg daily, through April 3, 2025, for 4 months of antibiotic treatment.    Prescription refills sent to [facility].     Avoid alcohol and acetaminophen (Tylenol) products while on Rifampin.    Complete lab work (CBCD and CMP) prior to next outpatient infectious disease follow up visit.    Return to the outpatient infectious disease office for follow up in 6 weeks.

## 2025-01-09 NOTE — ASSESSMENT & PLAN NOTE
Testing was performed as part of routine immigration lab work. In absence of acute respiratory symptoms, and negative chest xray, this finding likely represents diagnosis of latent tuberculosis.  -antibiotic as above

## 2025-01-09 NOTE — PROGRESS NOTES
Name: Niles Meyer      : 1980      MRN: [id number]  Encounter Provider: TABBY De La Torre  Encounter Date: 2025   Encounter department: [facility] [place]  :  Assessment & Plan  TB lung, latent  Positive QuantiFERON Gold testing on 2024. Testing was performed as part of routine immigration lab work. Risk factor is likely exposure in country of origin, [place]. The patient had a chest xray on 2024. I personally reviewed this study which showed no acute infectious cardiopulmonary findings. Patient additionally has had no active respiratory complaints, fevers, night sweats, or recent unexplained weight loss. Patient met with ID attending and discussed diagnosis. He did wish to pursue treatment. Patient was started on oral Rifampin. He's been tolerating the treatment without difficulty. I personally reviewed his new hepatic function panel from 2025 which showed normal LFTs. I will continue patient on the Rifampin for now, as planned to finish 4 months of treatment.  -continue PO Rifampin, 600mg daily, through 4/3/2025  -antibiotic sent to [facility]   -avoid ETOH and acetaminophen while on latent TB treatment  -check CBCD and CMP prior to next outpatient infectious disease follow up visit  -return to the outpatient infectious disease office in 6 weeks for follow up  Positive QuantiFERON-TB Gold test  Testing was performed as part of routine immigration lab work. In absence of acute respiratory symptoms, and negative chest xray, this finding likely represents diagnosis of latent tuberculosis.  -antibiotic as above    Above plan was discussed in detail with patient at the bedside utilizing [employer] medical interpretation service. 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      Antibiotics:  Rifampin     Subjective:  Patient presents to the outpatient ID office for follow up for ongoing treatment of latent TB. Since his last visit the patient has started oral Rifampin. He's been tolerating the antibiotic without difficulty. Today he has no fever, chills, sweats, shakes; no nausea, vomiting, abdominal pain, diarrhea, or dysuria; no cough, shortness of breath, or chest pain. No new symptoms. He is interested in getting DOT paperwork filled out. Is also asking about allergy testing as he's noticed some foods that he was fine it back in [place] have been giving him reactions here in the [place].     Objective:  Vitals:  HR 79  RR 17  O2 saturation 96% on room air  /75    Physical Exam:   General Appearance:  Alert, interactive, nontoxic, no acute distress. He appears comfortable sitting in the exam room.    Throat: Oropharynx moist without lesions.    Lungs:   Clear to auscultation bilaterally; no wheezes, rhonchi or rales; respirations unlabored on room air.   Heart:  RRR; no murmur, rub or gallop.   Abdomen:   Soft, non-tender, non-distended, positive bowel sounds.     Extremities: No clubbing or cyanosis, no edema.   Skin: No new rashes, lesions, or draining wounds noted on exposed skin.     Labs, Imaging, & Other studies:   All pertinent labs and imaging studies were personally reviewed by me including hepatic function panel collected on 1/11/2025.

## 2025-01-09 NOTE — ASSESSMENT & PLAN NOTE
Positive QuantiFERON Gold testing on 8/9/2024. Testing was performed as part of routine immigration lab work. Risk factor is likely exposure in country of origin, [place]. The patient had a chest xray on 8/17/2024. I personally reviewed this study which showed no acute infectious cardiopulmonary findings. Patient additionally has had no active respiratory complaints, fevers, night sweats, or recent unexplained weight loss. Patient met with ID attending and discussed diagnosis. He did wish to pursue treatment. Patient was started on oral Rifampin. He's been tolerating the treatment without difficulty. I personally reviewed his new hepatic function panel from 1/11/2025 which showed normal LFTs. I will continue patient on the Rifampin for now, as planned to finish 4 months of treatment.  -continue PO Rifampin, 600mg daily, through 4/3/2025  -antibiotic sent to [facility]   -avoid ETOH and acetaminophen while on latent TB treatment  -check CBCD and CMP prior to next outpatient infectious disease follow up visit  -return to the outpatient infectious disease office in 6 weeks for follow up

## 2025-01-10 NOTE — TELEPHONE ENCOUNTER
Contacted patient via [email or web address] as they required an . Dany (ID number: [id number]) conducted the call with me and relayed the following information to the patient:      1. Informed them of their upcoming appointment    2. Requested they have their blood work completed prior to visit.    3. Instructed patient on which lab would be convenient for them to travel to and complete said blood work, and assured patient that the order is placed. They simply have to go there to have it completed.     stated that patient states understanding all of the information above, and that they will be going tomorrow to have blood work completed prior to visit on 1/14 at 3:30 pm

## 2025-01-11 ENCOUNTER — APPOINTMENT (OUTPATIENT)
Dept: LAB | Facility: HOSPITAL | Age: 45
End: 2025-01-11
Payer: MEDICARE

## 2025-01-11 DIAGNOSIS — Z22.7 TB LUNG, LATENT: ICD-10-CM

## 2025-01-11 LAB
ALBUMIN SERPL BCG-MCNC: 4.7 G/DL (ref 3.5–5)
ALP SERPL-CCNC: 94 U/L (ref 34–104)
ALT SERPL W P-5'-P-CCNC: 26 U/L (ref 7–52)
AST SERPL W P-5'-P-CCNC: 24 U/L (ref 13–39)
BILIRUB DIRECT SERPL-MCNC: 0.14 MG/DL (ref 0–0.2)
BILIRUB SERPL-MCNC: 0.68 MG/DL (ref 0.2–1)
PROT SERPL-MCNC: 7.9 G/DL (ref 6.4–8.4)

## 2025-01-11 PROCEDURE — 80076 HEPATIC FUNCTION PANEL: CPT

## 2025-01-11 PROCEDURE — 36415 COLL VENOUS BLD VENIPUNCTURE: CPT

## 2025-01-14 ENCOUNTER — OFFICE VISIT (OUTPATIENT)
Dept: INFECTIOUS DISEASES | Facility: CLINIC | Age: 45
End: 2025-01-14
Payer: MEDICARE

## 2025-01-14 VITALS
RESPIRATION RATE: 17 BRPM | BODY MASS INDEX: 23.98 KG/M2 | HEART RATE: 79 BPM | DIASTOLIC BLOOD PRESSURE: 75 MMHG | HEIGHT: 72 IN | SYSTOLIC BLOOD PRESSURE: 118 MMHG | WEIGHT: 177 LBS | OXYGEN SATURATION: 96 %

## 2025-01-14 DIAGNOSIS — Z22.7 TB LUNG, LATENT: Primary | ICD-10-CM

## 2025-01-14 DIAGNOSIS — R76.12 POSITIVE QUANTIFERON-TB GOLD TEST: ICD-10-CM

## 2025-01-14 PROCEDURE — 99214 OFFICE O/P EST MOD 30 MIN: CPT | Performed by: NURSE PRACTITIONER

## 2025-01-14 RX ORDER — RIFAMPIN 300 MG/1
300 CAPSULE ORAL EVERY 12 HOURS SCHEDULED
Qty: 60 CAPSULE | Refills: 1 | Status: SHIPPED | OUTPATIENT
Start: 2025-01-14 | End: 2025-03-15

## 2025-01-14 RX ORDER — RIFAMPIN 300 MG/1
600 CAPSULE ORAL DAILY
Qty: 60 CAPSULE | Refills: 1 | Status: SHIPPED | OUTPATIENT
Start: 2025-01-14 | End: 2025-01-14

## 2025-02-25 ENCOUNTER — TELEPHONE (OUTPATIENT)
Dept: INFECTIOUS DISEASES | Facility: CLINIC | Age: 45
End: 2025-02-25

## 2025-02-25 NOTE — TELEPHONE ENCOUNTER
Called and left message for patient today using  [phone].   Reminded patient of his upcoming appointment and to have labs done prior to appointment so results can be gone over.   Informed patient if there are any questions to call the office back and office number provided.

## 2025-02-28 NOTE — ASSESSMENT & PLAN NOTE
Testing was performed as part of routine immigration lab work. In absence of acute respiratory symptoms, and negative chest xray, this finding likely represents diagnosis of latent tuberculosis.  -antibiotic as above  -we will mail patient his treatment completion letter next month

## 2025-02-28 NOTE — ASSESSMENT & PLAN NOTE
Positive QuantiFERON Gold testing on 8/9/2024. Testing was performed as part of routine immigration lab work. Risk factor is likely exposure in country of origin, [place]. The patient had a chest xray on 8/17/2024. I personally reviewed this study which showed no acute infectious cardiopulmonary findings. Patient additionally has had no active respiratory complaints, fevers, night sweats, or recent unexplained weight loss. Patient met with ID attending and discussed diagnosis. He did wish to pursue treatment. Patient was started on oral Rifampin. He's been tolerating the treatment without difficulty. I personally reviewed his newest lab work from 3/1/2025 which showed stable WBC count = 6.54, stable creatinine = 1.02, and normal LFTs. I will continue patient on the Rifampin for now, as planned to finish 4 months of treatment.  -continue PO Rifampin, 600mg daily, through 4/3/2025  -antibiotic sent to [facility]   -avoid ETOH and acetaminophen while on latent TB treatment  -check CBCD and CMP prior to next outpatient infectious disease follow up visit  -return to the outpatient infectious disease office for follow up as needed

## 2025-02-28 NOTE — PROGRESS NOTES
Name: Niles Meyer      : 1980      MRN: [id number]  Encounter Provider: TABBY De La Torre  Encounter Date: 3/4/2025   Encounter department: [facility] [place]  :  Assessment & Plan  TB lung, latent  Positive QuantiFERON Gold testing on 2024. Testing was performed as part of routine immigration lab work. Risk factor is likely exposure in [place] origin, [place]. The patient had a chest xray on 2024. I personally reviewed this study which showed no acute infectious cardiopulmonary findings. Patient additionally has had no active respiratory complaints, fevers, night sweats, or recent unexplained weight loss. Patient met with ID attending and discussed diagnosis. He did wish to pursue treatment. Patient was started on oral Rifampin. He's been tolerating the treatment without difficulty. I personally reviewed his newest lab work from 3/1/2025 which showed stable WBC count = 6.54, stable creatinine = 1.02, and normal LFTs. I will continue patient on the Rifampin for now, as planned to finish 4 months of treatment.  -continue PO Rifampin, 600mg daily, through 4/3/2025  -antibiotic sent to [facility]   -avoid ETOH and acetaminophen while on latent TB treatment  -check CBCD and CMP prior to next outpatient infectious disease follow up visit  -return to the outpatient infectious disease office for follow up as needed  Positive QuantiFERON-TB Gold test  Testing was performed as part of routine immigration lab work. In absence of acute respiratory symptoms, and negative chest xray, this finding likely represents diagnosis of latent tuberculosis.  -antibiotic as above  -we will mail patient his treatment completion letter next month    Above plan was discussed in detail with patient in the exam room utilizing [employer] interpretation services.    Antibiotics:  Rifampin    Subjective:  Patient presents to the outpatient infectious disease office  for follow up for ongoing antibiotic treatment of latent tuberculosis. Since his last visit the patient has continued his oral Rifampin which he's tolerating without difficulty. Patient continues to follow up with his family medicine team and is scheduled to see them again later this month. Today the patient has no fever, chills, sweats, shakes; no nausea, vomiting, abdominal pain, diarrhea, or dysuria; no cough, shortness of breath, or chest pain. No new symptoms.    Objective:  Vitals:  Temp 97.7  HR 69  O2 saturation 97% on room air  /72    Physical Exam:   General Appearance:  Alert, interactive, nontoxic, no acute distress. He appears comfortable sitting in the exam room.   Lungs:   Clear to auscultation bilaterally; no wheezes, rhonchi or rales; respirations unlabored on room air.   Heart:  RRR; no murmur, rub or gallop.   Abdomen:   Soft, non-tender, non-distended, positive bowel sounds.     Extremities: No clubbing or cyanosis, no edema.   Skin: No new rashes noted on exposed skin.     Labs, Imaging, & Other studies:   All pertinent labs and imaging studies were personally reviewed by me including CBCD and CMP collected on 3/1/2024.

## 2025-02-28 NOTE — PATIENT INSTRUCTIONS
Continue oral Rifampin, 600mg daily, through April 3, 2025, for 4 months of antibiotic treatment.     [facility].      Avoid alcohol and acetaminophen (Tylenol) products while on Rifampin.     Return to the outpatient infectious disease office for follow up as needed. We will mail you your treatment completion letter next month.

## 2025-03-01 ENCOUNTER — APPOINTMENT (OUTPATIENT)
Dept: LAB | Facility: HOSPITAL | Age: 45
End: 2025-03-01
Payer: MEDICARE

## 2025-03-01 DIAGNOSIS — Z22.7 TB LUNG, LATENT: ICD-10-CM

## 2025-03-01 LAB
ALBUMIN SERPL BCG-MCNC: 4.7 G/DL (ref 3.5–5)
ALP SERPL-CCNC: 104 U/L (ref 34–104)
ALT SERPL W P-5'-P-CCNC: 30 U/L (ref 7–52)
ANION GAP SERPL CALCULATED.3IONS-SCNC: 7 MMOL/L (ref 4–13)
AST SERPL W P-5'-P-CCNC: 28 U/L (ref 13–39)
BASOPHILS # BLD AUTO: 0.06 THOUSANDS/ÂΜL (ref 0–0.1)
BASOPHILS NFR BLD AUTO: 1 % (ref 0–1)
BILIRUB SERPL-MCNC: 0.48 MG/DL (ref 0.2–1)
BUN SERPL-MCNC: 11 MG/DL (ref 5–25)
CALCIUM SERPL-MCNC: 9.6 MG/DL (ref 8.4–10.2)
CHLORIDE SERPL-SCNC: 101 MMOL/L (ref 96–108)
CO2 SERPL-SCNC: 30 MMOL/L (ref 21–32)
CREAT SERPL-MCNC: 1.02 MG/DL (ref 0.6–1.3)
EOSINOPHIL # BLD AUTO: 0.4 THOUSAND/ÂΜL (ref 0–0.61)
EOSINOPHIL NFR BLD AUTO: 6 % (ref 0–6)
ERYTHROCYTE [DISTWIDTH] IN BLOOD BY AUTOMATED COUNT: 12.7 % (ref 11.6–15.1)
GFR SERPL CREATININE-BSD FRML MDRD: 90 ML/MIN/1.73SQ M
GLUCOSE P FAST SERPL-MCNC: 88 MG/DL (ref 65–99)
HCT VFR BLD AUTO: 43.9 % (ref 36.5–49.3)
HGB BLD-MCNC: 14.3 G/DL (ref 12–17)
IMM GRANULOCYTES # BLD AUTO: 0.01 THOUSAND/UL (ref 0–0.2)
IMM GRANULOCYTES NFR BLD AUTO: 0 % (ref 0–2)
LYMPHOCYTES # BLD AUTO: 3.16 THOUSANDS/ÂΜL (ref 0.6–4.47)
LYMPHOCYTES NFR BLD AUTO: 49 % (ref 14–44)
MCH RBC QN AUTO: 28.8 PG (ref 26.8–34.3)
MCHC RBC AUTO-ENTMCNC: 32.6 G/DL (ref 31.4–37.4)
MCV RBC AUTO: 89 FL (ref 82–98)
MONOCYTES # BLD AUTO: 0.47 THOUSAND/ÂΜL (ref 0.17–1.22)
MONOCYTES NFR BLD AUTO: 7 % (ref 4–12)
NEUTROPHILS # BLD AUTO: 2.44 THOUSANDS/ÂΜL (ref 1.85–7.62)
NEUTS SEG NFR BLD AUTO: 37 % (ref 43–75)
NRBC BLD AUTO-RTO: 0 /100 WBCS
PLATELET # BLD AUTO: 215 THOUSANDS/UL (ref 149–390)
PMV BLD AUTO: 11.8 FL (ref 8.9–12.7)
POTASSIUM SERPL-SCNC: 4.2 MMOL/L (ref 3.5–5.3)
PROT SERPL-MCNC: 8.2 G/DL (ref 6.4–8.4)
RBC # BLD AUTO: 4.96 MILLION/UL (ref 3.88–5.62)
SODIUM SERPL-SCNC: 138 MMOL/L (ref 135–147)
WBC # BLD AUTO: 6.54 THOUSAND/UL (ref 4.31–10.16)

## 2025-03-01 PROCEDURE — 36415 COLL VENOUS BLD VENIPUNCTURE: CPT

## 2025-03-01 PROCEDURE — 85025 COMPLETE CBC W/AUTO DIFF WBC: CPT

## 2025-03-01 PROCEDURE — 80053 COMPREHEN METABOLIC PANEL: CPT

## 2025-03-04 ENCOUNTER — OFFICE VISIT (OUTPATIENT)
Dept: INFECTIOUS DISEASES | Facility: CLINIC | Age: 45
End: 2025-03-04
Payer: MEDICARE

## 2025-03-04 VITALS
TEMPERATURE: 97.7 F | BODY MASS INDEX: 23.6 KG/M2 | DIASTOLIC BLOOD PRESSURE: 72 MMHG | WEIGHT: 174 LBS | SYSTOLIC BLOOD PRESSURE: 110 MMHG | HEART RATE: 69 BPM | OXYGEN SATURATION: 97 %

## 2025-03-04 DIAGNOSIS — R76.12 POSITIVE QUANTIFERON-TB GOLD TEST: ICD-10-CM

## 2025-03-04 DIAGNOSIS — Z22.7 TB LUNG, LATENT: Primary | ICD-10-CM

## 2025-03-04 PROCEDURE — 99213 OFFICE O/P EST LOW 20 MIN: CPT | Performed by: NURSE PRACTITIONER

## 2025-03-04 RX ORDER — RIFAMPIN 300 MG/1
600 CAPSULE ORAL DAILY
Qty: 38 CAPSULE | Refills: 0 | Status: SHIPPED | OUTPATIENT
Start: 2025-03-15 | End: 2025-04-03

## 2025-03-17 ENCOUNTER — OFFICE VISIT (OUTPATIENT)
Dept: FAMILY MEDICINE CLINIC | Facility: CLINIC | Age: 45
End: 2025-03-17

## 2025-03-17 VITALS
DIASTOLIC BLOOD PRESSURE: 70 MMHG | BODY MASS INDEX: 23.7 KG/M2 | HEIGHT: 72 IN | WEIGHT: 175 LBS | OXYGEN SATURATION: 97 % | RESPIRATION RATE: 18 BRPM | TEMPERATURE: 98.4 F | SYSTOLIC BLOOD PRESSURE: 122 MMHG | HEART RATE: 91 BPM

## 2025-03-17 DIAGNOSIS — Z00.00 ANNUAL PHYSICAL EXAM: Primary | ICD-10-CM

## 2025-03-17 DIAGNOSIS — Z11.59 NEED FOR HEPATITIS C SCREENING TEST: ICD-10-CM

## 2025-03-17 DIAGNOSIS — Z00.00 HEALTHCARE MAINTENANCE: ICD-10-CM

## 2025-03-17 DIAGNOSIS — Z11.4 SCREENING FOR HIV (HUMAN IMMUNODEFICIENCY VIRUS): ICD-10-CM

## 2025-03-17 PROCEDURE — 99396 PREV VISIT EST AGE 40-64: CPT | Performed by: FAMILY MEDICINE

## 2025-03-17 NOTE — PROGRESS NOTES
Adult Annual Physical  Name: Niles Meyer      : 1980      MRN: [id number]  Encounter Provider: Rebecca Fay Kab-Perlman, MD  Encounter Date: 3/17/2025   Encounter department: [facility]    Assessment & Plan  Annual physical exam  -completed today   -From prior visit with me where he had requirements from [employer] and he brought in a form, he is due for his second polio vaccine; he received his last polio vaccine on 2024 and per catch up he is now due for his second dose and subsequently 6-12 months later he will be due for his 3rd and final catch up dose    PLAN  -Sent a staff clerical message to have him scheduled for a nurse visit to perform second polio vaccine as 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  -Lipid panel ordered today per USPSTF men >/= age 35    Orders:    Lipid Panel with Direct LDL reflex; Future    Need for hepatitis C screening test  -Ordered today     Orders:    Hepatitis C Antibody; Future    Screening for HIV (human immunodeficiency virus)  -Ordered today     Orders:    HIV 1/2 AG/AB w Reflex [facility] for 2 yr old and above; Future    Healthcare maintenance  -Prostate Cancer screening to start age 55-70 with shared decision making; today not discussed  -No family history of colon cancer; colon cancer screening to start age 45: not discussed today  -Non-smoker no need for CT lung cancer screening            Preventive Screenings:  - Diabetes Screening: screening up-to-date  - Cholesterol Screening: orders placed   - Hepatitis C screening: orders placed   - HIV screening: orders placed   - Colon cancer screening: screening not indicated   - Lung cancer screening: screening not indicated   - Prostate cancer screening: screening not indicated     Immunizations:  - Immunizations due: Influenza and Tdap      Depression Screening and Follow-up Plan: Patient was screened for depression during today's encounter. They screened  negative with a PHQ-2 score of 0.        Interpretor Used ID# [id number]  History of Present Illness     Adult Annual Physical:  Patient presents for annual physical.     Diet and Physical Activity:  - Diet/Nutrition: poor diet.  - Exercise: no formal exercise.    Depression Screening:  - PHQ-2 Score: 0    General Health:  - Sleep: sleeps well and 7-8 hours of sleep on average.  - Hearing: normal hearing bilateral ears.  - Vision: most recent eye exam > 1 year ago and no vision problems.  - Dental: no dental visits for > 1 year, brushes teeth twice daily and does not floss.     Health:  - History of STDs: no.   - Urinary symptoms: none.     Advanced Care Planning:  - Has an advanced directive?: no    - Has a durable medical POA?: no    - ACP document given to patient?: no      Review of Systems  Pertinent Medical History   Latent TB currently following with ID and being treated      Medical History Reviewed by provider this encounter:  Tobacco  Allergies  Meds  Problems  Med Hx  Surg Hx  Fam Hx     .  Past Medical History   History reviewed. No pertinent past medical history.  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 No pertinent family history.   reports that he has never smoked. He has never been exposed to tobacco smoke. He has never used smokeless tobacco. He reports that he does not drink alcohol and does not use drugs.  Current Outpatient Medications   Medication Instructions    rifampin (RIFADIN) 600 mg, Oral, Daily   No Known Allergies   Current Outpatient Medications on File Prior to Visit   Medication Sig Dispense Refill    rifampin (RIFADIN) 300 mg capsule Take 2 capsules (600 mg total) by mouth daily for 19 days Do not start before March 15, 2025. 38 capsule 0     No current facility-administered medications on file prior to visit.      Social History     Tobacco Use    Smoking  status: Never     Passive exposure: Never    Smokeless tobacco: Never   Vaping Use    Vaping status: Never Used   Substance and Sexual Activity    Alcohol use: Never    Drug use: Never    Sexual activity: Not Currently       Objective   /70 (BP Location: Left arm, Patient Position: Sitting, Cuff Size: Large)   Pulse 91   Temp 98.4 °F (36.9 °C) (Temporal)   Resp 18   Ht 6' (1.829 m)   Wt 79.4 kg (175 lb)   SpO2 97%   BMI 23.73 kg/m²     Physical Exam  Constitutional:       Appearance: Normal appearance. He is normal weight.   HENT:      Head: Normocephalic and atraumatic.      Comments: No tenderness to palpation of sinuses     Right Ear: Ear canal and external ear normal. There is no impacted cerumen.      Left Ear: Ear canal and external ear normal. There is no impacted cerumen.      Ears:      Comments: Very small ear canals unable to visualize TM's however no pain to pulling and no pain to palpation of mastoid     Nose: Nose normal.      Comments: Very small nasal passage     Mouth/Throat:      Mouth: Mucous membranes are moist.      Comments: Very small posterior pharynx  Eyes:      Extraocular Movements: Extraocular movements intact.      Conjunctiva/sclera: Conjunctivae normal.      Pupils: Pupils are equal, round, and reactive to light.   Cardiovascular:      Rate and Rhythm: Normal rate and regular rhythm.      Heart sounds: Normal heart sounds. No murmur heard.     No friction rub. No gallop.   Pulmonary:      Effort: Pulmonary effort is normal.      Breath sounds: Normal breath sounds. No wheezing, rhonchi or rales.   Abdominal:      General: Abdomen is flat. Bowel sounds are normal. There is no distension.      Palpations: There is no mass.      Tenderness: There is no abdominal tenderness. There is no right CVA tenderness, left CVA tenderness, guarding or rebound.      Hernia: No hernia is present.   Musculoskeletal:         General: No swelling or deformity. Normal range of motion.       Cervical back: Normal range of motion and neck supple. No rigidity.      Right lower leg: No edema.      Left lower leg: No edema.   Lymphadenopathy:      Cervical: No cervical adenopathy.   Skin:     General: Skin is warm.   Neurological:      General: No focal deficit present.      Mental Status: He is alert and oriented to person, place, and time.      Cranial Nerves: No cranial nerve deficit.      Sensory: No sensory deficit.      Motor: No weakness (Muscle strength 5+ in upper and lower extremities).      Coordination: Coordination normal.      Gait: Gait normal.   Psychiatric:         Mood and Affect: Mood normal.         Behavior: Behavior normal.

## 2025-03-17 NOTE — PATIENT INSTRUCTIONS
"Patient Education     Routine physical for adults   The Basics   Written by the doctors and editors at [facility]   What is a physical? -- A physical is a routine visit, or \"check-up,\" with your doctor. You might also hear it called a \"wellness visit\" or \"preventive visit.\"  During each visit, the doctor will:   Ask about your physical and mental health   Ask about your habits, behaviors, and lifestyle   Do an exam   Give you vaccines if needed   Talk to you about any medicines you take   Give advice about your health   Answer your questions  Getting regular check-ups is an important part of taking care of your health. It can help your doctor find and treat any problems you have. But it's also important for preventing health problems.  A routine physical is different from a \"sick visit.\" A sick visit is when you see a doctor because of a health concern or problem. Since physicals are scheduled ahead of time, you can think about what you want to ask the doctor.  How often should I get a physical? -- It depends on your age and health. In general, for people age 21 years and older:   If you are younger than 50 years, you might be able to get a physical every 3 years.   If you are 50 years or older, your doctor might recommend a physical every year.  If you have an ongoing health condition, like diabetes or high blood pressure, your doctor will probably want to see you more often.  What happens during a physical? -- In general, each visit will include:   Physical exam - The doctor or nurse will check your height, weight, heart rate, and blood pressure. They will also look at your eyes and ears. They will ask about how you are feeling and whether you have any symptoms that bother you.   Medicines - It's a good idea to bring a list of all the medicines you take to each doctor visit. Your doctor will talk to you about your medicines and answer any questions. Tell them if you are having any side effects that bother you. You " "should also tell them if you are having trouble paying for any of your medicines.   Habits and behaviors - This includes:   Your diet   Your exercise habits   Whether you smoke, drink alcohol, or use drugs   Whether you are sexually active   Whether you feel safe at home  Your doctor will talk to you about things you can do to improve your health and lower your risk of health problems. They will also offer help and support. For example, if you want to quit smoking, they can give you advice and might prescribe medicines. If you want to improve your diet or get more physical activity, they can help you with this, too.   Lab tests, if needed - The tests you get will depend on your age and situation. For example, your doctor might want to check your:   Cholesterol   Blood sugar   Iron level   Vaccines - The recommended vaccines will depend on your age, health, and what vaccines you already had. Vaccines are very important because they can prevent certain serious or deadly infections.   Discussion of screening - \"Screening\" means checking for diseases or other health problems before they cause symptoms. Your doctor can recommend screening based on your age, risk, and preferences. This might include tests to check for:   Cancer, such as breast, prostate, cervical, ovarian, colorectal, prostate, lung, or skin cancer   Sexually transmitted infections, such as chlamydia and gonorrhea   Mental health conditions like depression and anxiety  Your doctor will talk to you about the different types of screening tests. They can help you decide which screenings to have. They can also explain what the results might mean.   Answering questions - The physical is a good time to ask the doctor or nurse questions about your health. If needed, they can refer you to other doctors or specialists, too.  Adults older than 65 years often need other care, too. As you get older, your doctor will talk to you about:   How to prevent falling at " home   Hearing or vision tests   Memory testing   How to take your medicines safely   Making sure that you have the help and support you need at home  All topics are updated as new evidence becomes available and our peer review process is complete.  This topic retrieved from [employer] on: May 02, 2024.  Topic 184784 Version 1.0  Release: 32.4.3 - C32.122  © 2024 UpToDate, Inc. and/or its affiliates. All rights reserved.  Consumer Information Use and Disclaimer   Disclaimer: This generalized information is a limited summary of diagnosis, treatment, and/or medication information. It is not meant to be comprehensive and should be used as a tool to help the user understand and/or assess potential diagnostic and treatment options. It does NOT include all information about conditions, treatments, medications, side effects, or risks that may apply to a specific patient. It is not intended to be medical advice or a substitute for the medical advice, diagnosis, or treatment of a health care provider based on the health care provider's examination and assessment of a patient's specific and unique circumstances. Patients must speak with a health care provider for complete information about their health, medical questions, and treatment options, including any risks or benefits regarding use of medications. This information does not endorse any treatments or medications as safe, effective, or approved for treating a specific patient. UpToDate, Inc. and its affiliates disclaim any warranty or liability relating to this information or the use thereof.The use of this information is governed by the Terms of Use, available at https://www.[employer] Enterpriseuwer.com/en/know/clinical-effectiveness-terms. 2024© UpToDate, Inc. and its affiliates and/or licensors. All rights reserved.  Copyright   © 2024 UpToDate, Inc. and/or its affiliates. All rights reserved.

## 2025-03-19 ENCOUNTER — APPOINTMENT (OUTPATIENT)
Dept: LAB | Facility: HOSPITAL | Age: 45
End: 2025-03-19
Payer: MEDICARE

## 2025-03-19 DIAGNOSIS — Z11.59 NEED FOR HEPATITIS C SCREENING TEST: ICD-10-CM

## 2025-03-19 DIAGNOSIS — Z11.4 SCREENING FOR HIV (HUMAN IMMUNODEFICIENCY VIRUS): ICD-10-CM

## 2025-03-19 DIAGNOSIS — Z00.00 ANNUAL PHYSICAL EXAM: ICD-10-CM

## 2025-03-19 LAB
CHOLEST SERPL-MCNC: 209 MG/DL (ref ?–200)
HDLC SERPL-MCNC: 40 MG/DL
LDLC SERPL CALC-MCNC: 149 MG/DL (ref 0–100)
TRIGL SERPL-MCNC: 99 MG/DL (ref ?–150)

## 2025-03-19 PROCEDURE — 80061 LIPID PANEL: CPT

## 2025-03-19 PROCEDURE — 36415 COLL VENOUS BLD VENIPUNCTURE: CPT

## 2025-03-19 PROCEDURE — 86803 HEPATITIS C AB TEST: CPT

## 2025-03-19 PROCEDURE — 87389 HIV-1 AG W/HIV-1&-2 AB AG IA: CPT

## 2025-03-19 NOTE — ASSESSMENT & PLAN NOTE
-Prostate Cancer screening to start age 55-70 with shared decision making; today not discussed  -No family history of colon cancer; colon cancer screening to start age 45: not discussed today  -Non-smoker no need for CT lung cancer screening

## 2025-03-20 ENCOUNTER — RESULTS FOLLOW-UP (OUTPATIENT)
Dept: FAMILY MEDICINE CLINIC | Facility: CLINIC | Age: 45
End: 2025-03-20

## 2025-03-20 LAB
HCV AB SER QL: NORMAL
HIV 1+2 AB+HIV1 P24 AG SERPL QL IA: NORMAL

## 2025-03-27 ENCOUNTER — TELEPHONE (OUTPATIENT)
Dept: FAMILY MEDICINE CLINIC | Facility: CLINIC | Age: 45
End: 2025-03-27

## 2025-04-01 NOTE — TELEPHONE ENCOUNTER
----- Message from Rebecca Kab-Perlman, MD sent at 3/19/2025 11:19 AM EDT -----  Dear clerical team,     Please reach out to Niles to schedule a nurse visit for the second polio vaccine as required by the department of homeland security. FYI; he speaks [place] Creole and will require an interpretor.    Thank you!  Dr. Kab-Perlman  
Please sched  
first attempt to contact patient. left message to return my call on answering machine.       
second attempt to contact patient. left message to return my call on answering machine.    
third attempt to contact patient. left message to return my call on answering machine.    Letter being sent.     
increased physiological demand for nutrients

## 2025-04-02 ENCOUNTER — TELEPHONE (OUTPATIENT)
Dept: INFECTIOUS DISEASES | Facility: CLINIC | Age: 45
End: 2025-04-02

## 2025-04-02 NOTE — TELEPHONE ENCOUNTER
Reached out via  [phone] to see if he has finished his antibiotic treatment, so that a completion letter can be sent out via mail. Patient states that he has finished his antibiotics. I have let him know that we would like to send him a letter that states that he has been treated and he shouldn't be treated again. He should additionally not be tested by blood for tuberculosis going forward.    Confirmed patient's address.  Patient has no further questions.

## 2025-04-18 PROBLEM — Z00.00 HEALTHCARE MAINTENANCE: Status: RESOLVED | Noted: 2025-03-19 | Resolved: 2025-04-18
